# Patient Record
Sex: MALE | Race: BLACK OR AFRICAN AMERICAN | NOT HISPANIC OR LATINO | URBAN - METROPOLITAN AREA
[De-identification: names, ages, dates, MRNs, and addresses within clinical notes are randomized per-mention and may not be internally consistent; named-entity substitution may affect disease eponyms.]

---

## 2022-05-01 ENCOUNTER — OFFICE VISIT (OUTPATIENT)
Dept: URGENT CARE | Facility: CLINIC | Age: 22
End: 2022-05-01
Payer: COMMERCIAL

## 2022-05-01 VITALS
TEMPERATURE: 99 F | OXYGEN SATURATION: 98 % | DIASTOLIC BLOOD PRESSURE: 69 MMHG | HEIGHT: 68 IN | RESPIRATION RATE: 18 BRPM | HEART RATE: 82 BPM | WEIGHT: 105 LBS | SYSTOLIC BLOOD PRESSURE: 112 MMHG | BODY MASS INDEX: 15.91 KG/M2

## 2022-05-01 DIAGNOSIS — S29.9XXA TRAUMATIC INJURY OF MID BACK: ICD-10-CM

## 2022-05-01 DIAGNOSIS — M54.9 ACUTE MIDLINE BACK PAIN, UNSPECIFIED BACK LOCATION: Primary | ICD-10-CM

## 2022-05-01 PROCEDURE — 72100 XR LUMBAR SPINE 2 OR 3 VIEWS: ICD-10-PCS | Mod: S$GLB,,, | Performed by: RADIOLOGY

## 2022-05-01 PROCEDURE — 1160F PR REVIEW ALL MEDS BY PRESCRIBER/CLIN PHARMACIST DOCUMENTED: ICD-10-PCS | Mod: CPTII,S$GLB,, | Performed by: PHYSICIAN ASSISTANT

## 2022-05-01 PROCEDURE — 3074F SYST BP LT 130 MM HG: CPT | Mod: CPTII,S$GLB,, | Performed by: PHYSICIAN ASSISTANT

## 2022-05-01 PROCEDURE — 72100 X-RAY EXAM L-S SPINE 2/3 VWS: CPT | Mod: S$GLB,,, | Performed by: RADIOLOGY

## 2022-05-01 PROCEDURE — 3074F PR MOST RECENT SYSTOLIC BLOOD PRESSURE < 130 MM HG: ICD-10-PCS | Mod: CPTII,S$GLB,, | Performed by: PHYSICIAN ASSISTANT

## 2022-05-01 PROCEDURE — 3078F PR MOST RECENT DIASTOLIC BLOOD PRESSURE < 80 MM HG: ICD-10-PCS | Mod: CPTII,S$GLB,, | Performed by: PHYSICIAN ASSISTANT

## 2022-05-01 PROCEDURE — 3078F DIAST BP <80 MM HG: CPT | Mod: CPTII,S$GLB,, | Performed by: PHYSICIAN ASSISTANT

## 2022-05-01 PROCEDURE — 99203 OFFICE O/P NEW LOW 30 MIN: CPT | Mod: S$GLB,,, | Performed by: PHYSICIAN ASSISTANT

## 2022-05-01 PROCEDURE — 99203 PR OFFICE/OUTPT VISIT, NEW, LEVL III, 30-44 MIN: ICD-10-PCS | Mod: S$GLB,,, | Performed by: PHYSICIAN ASSISTANT

## 2022-05-01 PROCEDURE — 3008F BODY MASS INDEX DOCD: CPT | Mod: CPTII,S$GLB,, | Performed by: PHYSICIAN ASSISTANT

## 2022-05-01 PROCEDURE — 3008F PR BODY MASS INDEX (BMI) DOCUMENTED: ICD-10-PCS | Mod: CPTII,S$GLB,, | Performed by: PHYSICIAN ASSISTANT

## 2022-05-01 PROCEDURE — 1159F PR MEDICATION LIST DOCUMENTED IN MEDICAL RECORD: ICD-10-PCS | Mod: CPTII,S$GLB,, | Performed by: PHYSICIAN ASSISTANT

## 2022-05-01 PROCEDURE — 1160F RVW MEDS BY RX/DR IN RCRD: CPT | Mod: CPTII,S$GLB,, | Performed by: PHYSICIAN ASSISTANT

## 2022-05-01 PROCEDURE — 1159F MED LIST DOCD IN RCRD: CPT | Mod: CPTII,S$GLB,, | Performed by: PHYSICIAN ASSISTANT

## 2022-05-01 RX ORDER — DICLOFENAC SODIUM 50 MG/1
50 TABLET, DELAYED RELEASE ORAL 3 TIMES DAILY PRN
Qty: 21 TABLET | Refills: 0 | Status: SHIPPED | OUTPATIENT
Start: 2022-05-01 | End: 2022-05-08

## 2022-05-01 NOTE — PROGRESS NOTES
"Subjective:       Patient ID: Abner Diaz is a 21 y.o. male.    Vitals:  height is 5' 8" (1.727 m) and weight is 47.6 kg (105 lb). His oral temperature is 98.5 °F (36.9 °C). His blood pressure is 112/69 and his pulse is 82. His respiration is 18 and oxygen saturation is 98%.     Chief Complaint: Back Pain    Patient presents for evaluation of back pain. Pt reports he was wrestling at home 5 days ago and fell backwards and hit the middle of his back on the corner of a bench at the foot of his bed.  Patient states that he has had pain localized to the site of trauma since then.  No pain down legs.  No paresthesia.  No prior major injury or surgery to back in the past.  No abdominal pain or urinary symptoms.  Denies any other complaints aside from localized back pain.    Back Pain  This is a new problem. The current episode started in the past 7 days. The pain is present in the thoracic spine. The quality of the pain is described as aching. The pain does not radiate. The pain is at a severity of 8/10. The pain is moderate. The symptoms are aggravated by lying down. Stiffness is present all day. Pertinent negatives include no abdominal pain, chest pain, dysuria, fever, headaches, numbness, tingling or weakness. Treatments tried: Aleeve.       Constitution: Negative for chills, sweating, fatigue and fever.   HENT: Negative for ear pain, postnasal drip and sore throat.    Neck: Negative for neck pain and neck stiffness.   Cardiovascular: Negative for chest pain, leg swelling, palpitations and sob on exertion.   Eyes: Negative for eye itching, eye pain and eye redness.   Respiratory: Negative for cough and sputum production.    Gastrointestinal: Negative for abdominal pain, nausea, vomiting and diarrhea.   Genitourinary: Negative for dysuria, frequency and urgency.   Musculoskeletal: Positive for pain, trauma and back pain.   Skin: Negative for color change, rash, wound and abrasion.   Neurological: Negative for " headaches, loss of consciousness, numbness and tingling.       Objective:      Physical Exam   Constitutional: He is oriented to person, place, and time. He appears well-developed. He is cooperative.  Non-toxic appearance. He does not appear ill. No distress.   HENT:   Head: Normocephalic and atraumatic.   Nose: Nose normal.   Mouth/Throat: Oropharynx is clear and moist and mucous membranes are normal.   Eyes: Conjunctivae and lids are normal.   Neck: Trachea normal and phonation normal. Neck supple.   Cardiovascular: Normal rate, regular rhythm, normal heart sounds and normal pulses.   Pulmonary/Chest: Effort normal and breath sounds normal. No accessory muscle usage or stridor. No apnea, no tachypnea and no bradypnea. He has no decreased breath sounds. He has no wheezes. He has no rhonchi.   Abdominal: Normal appearance and bowel sounds are normal. He exhibits no abdominal bruit, no pulsatile midline mass and no mass. Soft. There is no abdominal tenderness. There is no right CVA tenderness.      Comments: Abdomen soft nontender to palpation without rigidity or guarding.  No ecchymosis.  No CVA tenderness.   Musculoskeletal:         General: No deformity.        Back:    Neurological: He is alert and oriented to person, place, and time. He has normal strength and normal reflexes. No sensory deficit.   Skin: Skin is warm, dry, intact and not diaphoretic.   Psychiatric: His speech is normal and behavior is normal. Judgment and thought content normal.   Nursing note and vitals reviewed.        XR LUMBAR SPINE 2 OR 3 VIEWS    Result Date: 5/1/2022  EXAMINATION: XR LUMBAR SPINE 2 OR 3 VIEWS CLINICAL HISTORY: Dorsalgia, unspecified TECHNIQUE: AP lateral lumbar spine COMPARISON: None FINDINGS: There is straightening of the normal lordosis.  Otherwise vertebral body heights disc spaces and alignment is satisfactory.     Straightening of the normal lumbar lordosis.  Otherwise no significant abnormality seen. Electronically  signed by: Juan Luis Ryan MD Date:    05/01/2022 Time:    15:43    Smooth linear lucencies at tips of spinous process T12-L1 appears consistent with growth plate, does not have appearance of fracture  Assessment:       1. Acute midline back pain, unspecified back location    2. Traumatic injury of mid back          Plan:         Acute midline back pain, unspecified back location  -     Cancel: X-Ray Thoracolumbar Spine AP Lateral; Future; Expected date: 05/01/2022  -     XR LUMBAR SPINE 2 OR 3 VIEWS; Future; Expected date: 05/01/2022  -     diclofenac (VOLTAREN) 50 MG EC tablet; Take 1 tablet (50 mg total) by mouth 3 (three) times daily as needed (pain).  Dispense: 21 tablet; Refill: 0    Traumatic injury of mid back  -     XR LUMBAR SPINE 2 OR 3 VIEWS; Future; Expected date: 05/01/2022      Patient Instructions   - Rest.    - Drink plenty of fluids.      - you can take diclofenac as prescribed with food to help with pain and inflammation.  Do not take over the counter NSAIDs (aleve, advil, ibuprofen) while taking this medication.  You can take Tylenol as directed along with this medication for pain relief.    - Ice for 15-20 minutes at a time can help    - you can try otc lidocaine patches to apply to the area.    - Follow up with your PCP or specialty clinic as directed in the next 1-2 weeks if not improved or as needed.  You can call [phone] to schedule an appointment with the appropriate provider.    - Go to the ER or seek medical attention immediately if you develop new or worsening symptoms.     - You must understand that you have received an Urgent Care treatment only and that you may be released before all of your medical problems are known or treated.   - You, the patient, will arrange for follow up care as instructed.   - If your condition worsens or fails to improve we recommend that you receive another evaluation at the ER immediately or contact your PCP to discuss your concerns or return  here.

## 2022-05-01 NOTE — PATIENT INSTRUCTIONS
- Rest.    - Drink plenty of fluids.      - you can take diclofenac as prescribed with food to help with pain and inflammation.  Do not take over the counter NSAIDs (aleve, advil, ibuprofen) while taking this medication.  You can take Tylenol as directed along with this medication for pain relief.    - Ice for 15-20 minutes at a time can help    - you can try otc lidocaine patches to apply to the area.    - Follow up with your PCP or specialty clinic as directed in the next 1-2 weeks if not improved or as needed.  You can call [phone] to schedule an appointment with the appropriate provider.    - Go to the ER or seek medical attention immediately if you develop new or worsening symptoms.     - You must understand that you have received an Urgent Care treatment only and that you may be released before all of your medical problems are known or treated.   - You, the patient, will arrange for follow up care as instructed.   - If your condition worsens or fails to improve we recommend that you receive another evaluation at the ER immediately or contact your PCP to discuss your concerns or return here.

## 2025-06-05 DIAGNOSIS — R77.8 ELEVATED TOTAL PROTEIN: ICD-10-CM

## 2025-06-05 DIAGNOSIS — R77.1 ELEVATED SERUM GLOBULIN LEVEL: Primary | ICD-10-CM

## 2025-06-11 ENCOUNTER — LAB VISIT (OUTPATIENT)
Dept: LAB | Facility: HOSPITAL | Age: 25
End: 2025-06-11
Attending: STUDENT IN AN ORGANIZED HEALTH CARE EDUCATION/TRAINING PROGRAM
Payer: COMMERCIAL

## 2025-06-11 ENCOUNTER — OFFICE VISIT (OUTPATIENT)
Dept: HEMATOLOGY/ONCOLOGY | Facility: CLINIC | Age: 25
End: 2025-06-11
Payer: COMMERCIAL

## 2025-06-11 DIAGNOSIS — R77.8 ABNORMAL SPEP: ICD-10-CM

## 2025-06-11 DIAGNOSIS — Z76.89 ENCOUNTER TO ESTABLISH CARE: ICD-10-CM

## 2025-06-11 DIAGNOSIS — R77.8 ELEVATED TOTAL PROTEIN: ICD-10-CM

## 2025-06-11 DIAGNOSIS — R77.1 ELEVATED SERUM GLOBULIN LEVEL: ICD-10-CM

## 2025-06-11 DIAGNOSIS — R77.1 ELEVATED SERUM GLOBULIN LEVEL: Primary | ICD-10-CM

## 2025-06-11 LAB
ABSOLUTE EOSINOPHIL (OHS): 0.13 K/UL
ABSOLUTE MONOCYTE (OHS): 0.51 K/UL (ref 0.3–1)
ABSOLUTE NEUTROPHIL COUNT (OHS): 3.47 K/UL (ref 1.8–7.7)
ALBUMIN SERPL BCP-MCNC: 4.5 G/DL (ref 3.5–5.2)
ALP SERPL-CCNC: 48 UNIT/L (ref 40–150)
ALT SERPL W/O P-5'-P-CCNC: 14 UNIT/L (ref 10–44)
ANION GAP (OHS): 9 MMOL/L (ref 8–16)
AST SERPL-CCNC: 19 UNIT/L (ref 11–45)
BASOPHILS # BLD AUTO: 0.02 K/UL
BASOPHILS NFR BLD AUTO: 0.4 %
BILIRUB SERPL-MCNC: 0.8 MG/DL (ref 0.1–1)
BUN SERPL-MCNC: 7 MG/DL (ref 6–20)
CALCIUM SERPL-MCNC: 9.5 MG/DL (ref 8.7–10.5)
CHLORIDE SERPL-SCNC: 104 MMOL/L (ref 95–110)
CO2 SERPL-SCNC: 30 MMOL/L (ref 23–29)
CREAT SERPL-MCNC: 0.9 MG/DL (ref 0.5–1.4)
ERYTHROCYTE [DISTWIDTH] IN BLOOD BY AUTOMATED COUNT: 12.6 % (ref 11.5–14.5)
ERYTHROCYTE [SEDIMENTATION RATE] IN BLOOD BY PHOTOMETRIC METHOD: 4 MM/HR
GFR SERPLBLD CREATININE-BSD FMLA CKD-EPI: >60 ML/MIN/1.73/M2
GLUCOSE SERPL-MCNC: 89 MG/DL (ref 70–110)
HCT VFR BLD AUTO: 41.4 % (ref 40–54)
HGB BLD-MCNC: 13.4 GM/DL (ref 14–18)
IMM GRANULOCYTES # BLD AUTO: 0.01 K/UL (ref 0–0.04)
IMM GRANULOCYTES NFR BLD AUTO: 0.2 % (ref 0–0.5)
LYMPHOCYTES # BLD AUTO: 0.92 K/UL (ref 1–4.8)
MCH RBC QN AUTO: 31 PG (ref 27–31)
MCHC RBC AUTO-ENTMCNC: 32.4 G/DL (ref 32–36)
MCV RBC AUTO: 96 FL (ref 82–98)
NUCLEATED RBC (/100WBC) (OHS): 0 /100 WBC
PLATELET # BLD AUTO: 225 K/UL (ref 150–450)
PMV BLD AUTO: 10.6 FL (ref 9.2–12.9)
POTASSIUM SERPL-SCNC: 4 MMOL/L (ref 3.5–5.1)
PROT SERPL-MCNC: 8.8 GM/DL (ref 6–8.4)
RBC # BLD AUTO: 4.32 M/UL (ref 4.6–6.2)
RELATIVE EOSINOPHIL (OHS): 2.6 %
RELATIVE LYMPHOCYTE (OHS): 18.2 % (ref 18–48)
RELATIVE MONOCYTE (OHS): 10.1 % (ref 4–15)
RELATIVE NEUTROPHIL (OHS): 68.5 % (ref 38–73)
SODIUM SERPL-SCNC: 143 MMOL/L (ref 136–145)
WBC # BLD AUTO: 5.06 K/UL (ref 3.9–12.7)

## 2025-06-11 PROCEDURE — 82784 ASSAY IGA/IGD/IGG/IGM EACH: CPT | Mod: 59

## 2025-06-11 PROCEDURE — 83521 IG LIGHT CHAINS FREE EACH: CPT

## 2025-06-11 PROCEDURE — 84165 PROTEIN E-PHORESIS SERUM: CPT

## 2025-06-11 PROCEDURE — 86334 IMMUNOFIX E-PHORESIS SERUM: CPT | Mod: ,,, | Performed by: PATHOLOGY

## 2025-06-11 PROCEDURE — 86334 IMMUNOFIX E-PHORESIS SERUM: CPT

## 2025-06-11 PROCEDURE — 84165 PROTEIN E-PHORESIS SERUM: CPT | Mod: ,,, | Performed by: PATHOLOGY

## 2025-06-11 PROCEDURE — 99999 PR PBB SHADOW E&M-EST. PATIENT-LVL II: CPT | Mod: PBBFAC,,, | Performed by: STUDENT IN AN ORGANIZED HEALTH CARE EDUCATION/TRAINING PROGRAM

## 2025-06-11 PROCEDURE — 82784 ASSAY IGA/IGD/IGG/IGM EACH: CPT

## 2025-06-11 PROCEDURE — 84460 ALANINE AMINO (ALT) (SGPT): CPT

## 2025-06-11 PROCEDURE — 36415 COLL VENOUS BLD VENIPUNCTURE: CPT

## 2025-06-11 PROCEDURE — 85652 RBC SED RATE AUTOMATED: CPT

## 2025-06-11 PROCEDURE — 85025 COMPLETE CBC W/AUTO DIFF WBC: CPT

## 2025-06-11 NOTE — PROGRESS NOTES
Hematology- Oncology Consult Note :     6/11/2025    Chief Complaint   Patient presents with    Establish Care    Abnormal Lab         HPI  Pt is a 24 y.o. male with no significant pmhx who presents for follow up of abnormal SPEP.  Per records (in media) appears reactive and from inflammation but will complete workup with remaining paraprotein labs.  No issues at time of exam and all questions answered to his satisfaction.        History reviewed. No pertinent past medical history.   History reviewed. No pertinent surgical history.   Prescriptions Prior to Admission[1]  Review of patient's allergies indicates:  No Known Allergies   Social History     Tobacco Use    Smoking status: Never     Passive exposure: Never    Smokeless tobacco: Never   Substance Use Topics    Alcohol use: Not on file      No family history on file.     Review of Systems :  Review of Systems   Constitutional:  Negative for chills, fever, malaise/fatigue and weight loss.   HENT:  Negative for congestion, hearing loss and nosebleeds.    Eyes:  Negative for blurred vision.   Respiratory:  Negative for cough and shortness of breath.    Cardiovascular:  Negative for chest pain and leg swelling.   Gastrointestinal:  Negative for abdominal pain, blood in stool, constipation, diarrhea, heartburn, melena, nausea and vomiting.   Genitourinary:  Negative for dysuria and hematuria.   Musculoskeletal:  Negative for joint pain and myalgias.   Skin:  Negative for itching and rash.   Neurological:  Negative for dizziness and weakness.   Endo/Heme/Allergies:  Does not bruise/bleed easily.   Psychiatric/Behavioral:  Negative for depression. The patient is not nervous/anxious.        Physical Exam :  Wt Readings from Last 3 Encounters:   05/01/22 47.6 kg (105 lb) (<1%, Z= -2.90)*     * Growth percentiles are based on CDC (Boys, 2-20 Years) data.     Temp Readings from Last 3 Encounters:   05/01/22 98.5 °F (36.9 °C) (Oral)     BP Readings from Last 3 Encounters:    05/01/22 112/69     Pulse Readings from Last 3 Encounters:   05/01/22 82     There is no height or weight on file to calculate BMI.    Physical Exam  Vitals reviewed.   Constitutional:       Appearance: Normal appearance.   HENT:      Head: Normocephalic and atraumatic.      Nose: Nose normal.      Mouth/Throat:      Mouth: Mucous membranes are moist.   Eyes:      Pupils: Pupils are equal, round, and reactive to light.   Cardiovascular:      Rate and Rhythm: Normal rate and regular rhythm.   Pulmonary:      Effort: Pulmonary effort is normal.   Abdominal:      General: Abdomen is flat.   Musculoskeletal:         General: Normal range of motion.      Cervical back: Neck supple.   Skin:     General: Skin is warm and dry.   Neurological:      Mental Status: He is alert. Mental status is at baseline.   Psychiatric:         Mood and Affect: Mood normal.         Behavior: Behavior normal.           Pertinent Diagnostic studies:    No results found for this or any previous visit (from the past 24 hours).    Assessment/Plan :       Abnormal SPEP  -Most likely inflammatory but will complete paraprotein workup  -will complete paraprotein workup today  -RTC in 3 weeks for MD virtual visit          Time spent on case: 45 minutes     Summary of orders placed this encounter:  No orders of the defined types were placed in this encounter.      No future appointments.          John Wallace MD   Hematology/oncology, [facility] [place]           [1] (Not in a hospital admission)

## 2025-06-12 LAB
IGA SERPL-MCNC: 640 MG/DL (ref 40–350)
IGG SERPL-MCNC: 1806 MG/DL (ref 650–1600)
IGM SERPL-MCNC: 153 MG/DL (ref 50–300)

## 2025-06-13 LAB
ALBUMIN, SPE (OHS): 4.63 G/DL (ref 3.35–5.55)
ALPHA 1 GLOB (OHS): 0.24 GM/DL (ref 0.17–0.41)
ALPHA 2 GLOB (OHS): 0.47 GM/DL (ref 0.43–0.99)
BETA GLOB (OHS): 1.03 GM/DL (ref 0.5–1.1)
GAMMA GLOBULIN (OHS): 1.73 GM/DL (ref 0.67–1.58)
KAPPA LC FREE SER-MCNC: 1.26 MG/L (ref 0.26–1.65)
KAPPA LC FREE/LAMBDA FREE SER: 1.8 MG/DL (ref 0.33–1.94)
LAMBDA LC FREE SERPL-MCNC: 1.43 MG/DL (ref 0.57–2.63)
PATHOLOGIST INTERPRETATION - IFE SERUM (OHS): NORMAL
PATHOLOGIST REVIEW - SPE (OHS): NORMAL
PROT SERPL-MCNC: 8.1 GM/DL (ref 6–8.4)

## 2025-07-02 ENCOUNTER — OFFICE VISIT (OUTPATIENT)
Dept: HEMATOLOGY/ONCOLOGY | Facility: CLINIC | Age: 25
End: 2025-07-02
Payer: COMMERCIAL

## 2025-07-02 DIAGNOSIS — Z71.2 ENCOUNTER TO DISCUSS TEST RESULTS: ICD-10-CM

## 2025-07-02 DIAGNOSIS — R77.8 ABNORMAL SPEP: ICD-10-CM

## 2025-07-02 DIAGNOSIS — R77.8 ELEVATED TOTAL PROTEIN: ICD-10-CM

## 2025-07-02 DIAGNOSIS — R77.1 ELEVATED SERUM GLOBULIN LEVEL: Primary | ICD-10-CM

## 2025-07-02 PROCEDURE — 98004 SYNCH AUDIO-VIDEO EST SF 10: CPT | Mod: 95,,, | Performed by: STUDENT IN AN ORGANIZED HEALTH CARE EDUCATION/TRAINING PROGRAM

## 2025-07-02 NOTE — PROGRESS NOTES
The patient location is: [place]  The chief complaint leading to consultation is: results    Visit type: audiovisual    Face to Face time with patient: 10  10 minutes of total time spent on the encounter, which includes face to face time and non-face to face time preparing to see the patient (eg, review of tests), Obtaining and/or reviewing separately obtained history, Documenting clinical information in the electronic or other health record, Independently interpreting results (not separately reported) and communicating results to the patient/family/caregiver, or Care coordination (not separately reported).         Each patient to whom he or she provides medical services by telemedicine is:  (1) informed of the relationship between the physician and patient and the respective role of any other health care provider with respect to management of the patient; and (2) notified that he or she may decline to receive medical services by telemedicine and may withdraw from such care at any time.    Notes:     HPI  Pt is a 24 y.o. male with no significant pmhx who presents for follow up of abnormal SPEP.  Per records (in media) appears reactive and from inflammation and repeat paraprotein labs negative for M spike and reviewed with pt.  No issues at time of exam and all questions answered to his satisfaction.      Abnormal SPEP  -Most likely inflammatory as FLC ratio wnl and no monoclonal spike seen on workup  -Workup reviewed with pt and most likely reactive in nature not from a plasma cell disorder  -RTC PRN